# Patient Record
Sex: FEMALE | Race: WHITE | Employment: OTHER | URBAN - METROPOLITAN AREA
[De-identification: names, ages, dates, MRNs, and addresses within clinical notes are randomized per-mention and may not be internally consistent; named-entity substitution may affect disease eponyms.]

---

## 2017-06-14 ENCOUNTER — OFFICE VISIT (OUTPATIENT)
Dept: INTERNAL MEDICINE CLINIC | Facility: CLINIC | Age: 57
End: 2017-06-14

## 2017-06-14 VITALS
HEART RATE: 68 BPM | HEIGHT: 62.25 IN | OXYGEN SATURATION: 99 % | TEMPERATURE: 98 F | SYSTOLIC BLOOD PRESSURE: 112 MMHG | DIASTOLIC BLOOD PRESSURE: 82 MMHG | BODY MASS INDEX: 20.53 KG/M2 | RESPIRATION RATE: 16 BRPM | WEIGHT: 113 LBS

## 2017-06-14 DIAGNOSIS — J01.00 ACUTE MAXILLARY SINUSITIS, RECURRENCE NOT SPECIFIED: Primary | ICD-10-CM

## 2017-06-14 PROCEDURE — 99213 OFFICE O/P EST LOW 20 MIN: CPT | Performed by: PHYSICIAN ASSISTANT

## 2017-06-14 RX ORDER — AMOXICILLIN AND CLAVULANATE POTASSIUM 875; 125 MG/1; MG/1
1 TABLET, FILM COATED ORAL 2 TIMES DAILY
Qty: 20 TABLET | Refills: 0 | Status: SHIPPED | OUTPATIENT
Start: 2017-06-14 | End: 2017-06-24

## 2017-06-14 RX ORDER — ACETAMINOPHEN, ASPIRIN AND CAFFEINE 250; 250; 65 MG/1; MG/1; MG/1
1 TABLET, FILM COATED ORAL EVERY 6 HOURS PRN
COMMUNITY

## 2017-06-14 NOTE — PROGRESS NOTES
Chris Beltrán is a 61year old female  Patient presents with:  Sinus Problem  Migraine      HPI:   Pt states she has not felt well since early May  - has had sinus congestion, headache/migraine on/off  - pt has had ear pressure, PND, feels ache in yolie above   CARDIOVASCULAR: denies chest pain on exertion  GI: denies abdominal pain and denies heartburn, change in appetite or bm's    EXAM:   /82 mmHg  Pulse 68  Temp(Src) 98.3 °F (36.8 °C) (Oral)  Resp 16  Ht 62.25\"  Wt 113 lb  BMI 20.51 kg/m2  SpO2

## 2017-11-17 ENCOUNTER — RESEARCH ENCOUNTER (OUTPATIENT)
Dept: HEMATOLOGY/ONCOLOGY | Facility: HOSPITAL | Age: 57
End: 2017-11-17

## 2017-11-17 NOTE — PROGRESS NOTES
Sent patient certified letter for continued participation on clinical trial [id number] follow up.

## 2017-12-12 ENCOUNTER — OFFICE VISIT (OUTPATIENT)
Dept: INTERNAL MEDICINE CLINIC | Facility: CLINIC | Age: 57
End: 2017-12-12

## 2017-12-12 VITALS
WEIGHT: 118 LBS | RESPIRATION RATE: 12 BRPM | DIASTOLIC BLOOD PRESSURE: 78 MMHG | TEMPERATURE: 98 F | HEIGHT: 62 IN | BODY MASS INDEX: 21.71 KG/M2 | SYSTOLIC BLOOD PRESSURE: 128 MMHG | HEART RATE: 64 BPM

## 2017-12-12 DIAGNOSIS — J32.9 RECURRENT SINUSITIS: Primary | ICD-10-CM

## 2017-12-12 PROCEDURE — 99213 OFFICE O/P EST LOW 20 MIN: CPT | Performed by: INTERNAL MEDICINE

## 2017-12-12 RX ORDER — MOXIFLOXACIN HYDROCHLORIDE 400 MG/1
400 TABLET ORAL DAILY
Qty: 14 TABLET | Refills: 0 | Status: SHIPPED | OUTPATIENT
Start: 2017-12-12 | End: 2017-12-13

## 2017-12-12 NOTE — PROGRESS NOTES
Carson Gilbert is a 61year old female.   HPI:   CC: 1week of vertigo and headache  Purulent mucus now  Pt states dairy triggered it  She did have an ENT lady doctor in the past for chronic sinus infections  Pt states never allergy tested for dairy Comment: 1 drink per day     Family History   Problem Relation Age of Onset   • Heart Disease Father    • Diabetes Father    • Hypertension Father    • Heart Surgery Father      66's   • Heart Disease Mother    • Diabetes Mother    • Hypertension Mother

## 2017-12-13 ENCOUNTER — TELEPHONE (OUTPATIENT)
Dept: INTERNAL MEDICINE CLINIC | Facility: CLINIC | Age: 57
End: 2017-12-13

## 2017-12-13 RX ORDER — LEVOFLOXACIN 500 MG/1
500 TABLET, FILM COATED ORAL DAILY
Qty: 14 TABLET | Refills: 0 | OUTPATIENT
Start: 2017-12-13 | End: 2018-04-04 | Stop reason: ALTCHOICE

## 2017-12-13 NOTE — TELEPHONE ENCOUNTER
Called pharmacy due to page to on call doctor. Pt has red dye allergy and needs new ABT.   Called in Levaquin 500mg DQ x 14 days per Dr Steffi Hernandez

## 2018-04-04 ENCOUNTER — OFFICE VISIT (OUTPATIENT)
Dept: FAMILY MEDICINE CLINIC | Facility: CLINIC | Age: 58
End: 2018-04-04

## 2018-04-04 VITALS
BODY MASS INDEX: 22 KG/M2 | DIASTOLIC BLOOD PRESSURE: 80 MMHG | HEART RATE: 76 BPM | TEMPERATURE: 98 F | OXYGEN SATURATION: 99 % | WEIGHT: 118 LBS | RESPIRATION RATE: 18 BRPM | SYSTOLIC BLOOD PRESSURE: 128 MMHG

## 2018-04-04 DIAGNOSIS — J01.00 ACUTE MAXILLARY SINUSITIS, RECURRENCE NOT SPECIFIED: Primary | ICD-10-CM

## 2018-04-04 PROCEDURE — 99213 OFFICE O/P EST LOW 20 MIN: CPT | Performed by: NURSE PRACTITIONER

## 2018-04-04 RX ORDER — AMOXICILLIN AND CLAVULANATE POTASSIUM 875; 125 MG/1; MG/1
1 TABLET, FILM COATED ORAL 2 TIMES DAILY
Qty: 20 TABLET | Refills: 0 | Status: SHIPPED | OUTPATIENT
Start: 2018-04-04 | End: 2018-04-14

## 2018-04-04 NOTE — PROGRESS NOTES
CHIEF COMPLAINT:   Patient presents with:  Cough: for 1 week  URI: for 1 week      HPI:   Amada Guy is a 60year old female who presents for sinus congestion for over a week. Symptoms have been worsening since onset.  Sinus congestion/pain is descr • Breast Cancer Daughter 16     removal cancerous tumor of breast   • Cancer Maternal Aunt      Thyroid & removal of tumor in kidney   • Cancer Paternal Aunt      colon & bladder      Smoking status: Former Smoker ASSESSMENT:  Daly Stewart is a 60year old female who presents with sinus infection following  A URI for over a week. Reports chills, purulent nasal drainage, facial pressure and tooth pain when chewing. Will treat with antibiotic.   Educated on [employer] ABRS most often follows an upper respiratory infection caused by a virus. Bacteria then infect the lining of your nasal cavity and sinuses.  But you can also get ABRS if you have:  · Nasal allergies  · Long-term nasal swelling and congestion not caused by a These problems may need to be treated in a hospital with intravenous (IV) antibiotic medicine or surgery.   When to call the healthcare provider  Call your healthcare provider if you have any of the following:  · Symptoms that don’t get better, or get worse

## 2018-04-18 ENCOUNTER — OFFICE VISIT (OUTPATIENT)
Dept: FAMILY MEDICINE CLINIC | Facility: CLINIC | Age: 58
End: 2018-04-18

## 2018-04-18 VITALS
WEIGHT: 122 LBS | OXYGEN SATURATION: 98 % | BODY MASS INDEX: 22 KG/M2 | SYSTOLIC BLOOD PRESSURE: 120 MMHG | TEMPERATURE: 98 F | RESPIRATION RATE: 18 BRPM | HEART RATE: 70 BPM | DIASTOLIC BLOOD PRESSURE: 60 MMHG

## 2018-04-18 DIAGNOSIS — J01.01 ACUTE RECURRENT MAXILLARY SINUSITIS: Primary | ICD-10-CM

## 2018-04-18 PROCEDURE — 99213 OFFICE O/P EST LOW 20 MIN: CPT | Performed by: NURSE PRACTITIONER

## 2018-04-18 RX ORDER — DOXYCYCLINE HYCLATE 100 MG/1
100 CAPSULE ORAL 2 TIMES DAILY
Qty: 20 CAPSULE | Refills: 0 | Status: SHIPPED | OUTPATIENT
Start: 2018-04-18 | End: 2018-04-28

## 2018-04-19 NOTE — PROGRESS NOTES
CHIEF COMPLAINT:   Patient presents with:  Sinus Problem: pt recently completed 10 days of abx, continues to have symptoms with fever, drainage, facial and  tooth pain.        HPI:   Gisele Gillis is a 60year old female who presents for sinus congesti • Heart Disease Mother    • Diabetes Mother    • Hypertension Mother    • Stroke Mother      52's   • Cancer Mother      thyroid, skin   • Breast Cancer Daughter 16     removal cancerous tumor of breast   • Cancer Maternal Aunt      Thyroid & removal of tu ASSESSMENT:  Alejandra Guzmán is a 60year old female who presents with reoccurrence of maxillary sinus infection. Pt continues to have no grade temps, facial and molar pain, purulent drainage.  Has long history of migraines, pt unsure if recent migraines The nasal cavity is the large air-filled space behind your nose. The sinuses are a group of spaces formed by the bones of your face. They connect with your nasal cavity. ABRS causes the tissue lining these spaces to become inflamed.  Mucus may not drain nor · Salt wash (saline irrigation). This can help to loosen mucus. Possible complications of ABRS  ABRS may come back or become long-term (chronic).  In rare cases, ABRS may cause complications such as:   · Inflamed tissue around the brain and spinal cord (me

## 2018-09-26 ENCOUNTER — OFFICE VISIT (OUTPATIENT)
Dept: FAMILY MEDICINE CLINIC | Facility: CLINIC | Age: 58
End: 2018-09-26
Payer: COMMERCIAL

## 2018-09-26 VITALS
OXYGEN SATURATION: 98 % | RESPIRATION RATE: 14 BRPM | TEMPERATURE: 98 F | SYSTOLIC BLOOD PRESSURE: 122 MMHG | DIASTOLIC BLOOD PRESSURE: 60 MMHG | HEART RATE: 70 BPM

## 2018-09-26 DIAGNOSIS — J01.90 ACUTE BACTERIAL RHINOSINUSITIS: Primary | ICD-10-CM

## 2018-09-26 DIAGNOSIS — B96.89 ACUTE BACTERIAL RHINOSINUSITIS: Primary | ICD-10-CM

## 2018-09-26 PROCEDURE — 99213 OFFICE O/P EST LOW 20 MIN: CPT | Performed by: NURSE PRACTITIONER

## 2018-09-26 RX ORDER — DOXYCYCLINE HYCLATE 100 MG/1
100 CAPSULE ORAL 2 TIMES DAILY
Qty: 20 CAPSULE | Refills: 0 | Status: SHIPPED | OUTPATIENT
Start: 2018-09-26 | End: 2018-10-06

## 2018-09-26 NOTE — PROGRESS NOTES
CHIEF COMPLAINT:   Patient presents with:  Sinus Problem      HPI:   Rylie Lilly is a 60year old female who presents for sinus infection. Patient admits to a hx of sinus infections r/t dairy products.  In [place] had a table spoon of rice puddi 52's   • Cancer Mother         thyroid, skin   • Breast Cancer Daughter 16        removal cancerous tumor of breast   • Cancer Maternal Aunt         Thyroid & removal of tumor in kidney   • Cancer Paternal Aunt         colon & bladder      Social Hist • Doxycycline Hyclate 100 MG Oral Cap 20 capsule 0     Sig: Take 1 capsule (100 mg total) by mouth 2 (two) times daily for 10 days. Risks, benefits, side effects of medication addressed and explained.     Patient Instructions       Acute Bacterial Rhi ABRS may be diagnosed if you’ve had an upper respiratory infection like a cold and cough for 10 or more days without improvement or with worsening symptoms. Your healthcare provider will ask about your symptoms and your medical history.  The provider will c © 1386-4158 [employer] [address] [facility] [place] [facility] [place]. All rights reserved. This information is not intended as a substitute for professional medical care. Always follow your healthcare professional's instructions.           Brittany

## 2018-10-16 ENCOUNTER — OFFICE VISIT (OUTPATIENT)
Dept: FAMILY MEDICINE CLINIC | Facility: CLINIC | Age: 58
End: 2018-10-16
Payer: COMMERCIAL

## 2018-10-16 VITALS
WEIGHT: 117 LBS | HEART RATE: 67 BPM | TEMPERATURE: 98 F | BODY MASS INDEX: 21.53 KG/M2 | DIASTOLIC BLOOD PRESSURE: 82 MMHG | RESPIRATION RATE: 16 BRPM | HEIGHT: 62 IN | OXYGEN SATURATION: 97 % | SYSTOLIC BLOOD PRESSURE: 122 MMHG

## 2018-10-16 DIAGNOSIS — J01.01 ACUTE RECURRENT MAXILLARY SINUSITIS: Primary | ICD-10-CM

## 2018-10-16 PROCEDURE — 99213 OFFICE O/P EST LOW 20 MIN: CPT | Performed by: PHYSICIAN ASSISTANT

## 2018-10-16 RX ORDER — AMOXICILLIN AND CLAVULANATE POTASSIUM 875; 125 MG/1; MG/1
1 TABLET, FILM COATED ORAL 2 TIMES DAILY
Qty: 20 TABLET | Refills: 0 | Status: SHIPPED | OUTPATIENT
Start: 2018-10-16 | End: 2018-10-26

## 2018-10-16 NOTE — PROGRESS NOTES
CHIEF COMPLAINT:   Patient presents with:  Sinus Problem: had 3 days of migraines, finished antibiotics 5 days ago, felt great after first dose of doxycycline, feeling achy      HPI:   Ellis Suarez is a 60year old female who presents for ongoing s • Heart Surgery Father         66's   • Heart Disease Mother    • Diabetes Mother    • Hypertension Mother    • Stroke Mother         52's   • Cancer Mother         thyroid, skin   • Breast Cancer Daughter 16        removal cancerous tumor of breast   • Ca No results found for this or any previous visit (from the past 24 hour(s)).     ASSESSMENT AND PLAN:   Cristiano Nunez is a 60year old female who presents with URI symptoms that are consistent with:    ASSESSMENT:  Acute recurrent maxillary sinusitis  ( Applying heat to the area surrounding your sinuses may make you feel more comfortable. Use a hot water bottle or a hand towel dipped in hot water. Some people also find ice packs effective for relieving pain.   Medicines  Your doctor may prescribe medicatio · Heat may help soothe painful areas of your face. Use a towel soaked in hot water. Or,  the shower and direct the warm spray onto your face.  Using a vaporizer along with a menthol rub at night may also help soothe symptoms.   · An expectorant with · Swelling of your forehead or eyelids  · Vision problems, such as blurred or double vision  · Fever of 100.4ºF (38ºC) or higher, or as directed by your healthcare provider  · Seizure  · Breathing problems  · Symptoms don't go away in 10 days  Prevention

## 2018-10-16 NOTE — PATIENT INSTRUCTIONS
Self-Care for Sinusitis     Drinking plenty of water can help sinuses drain. Sinusitis can often be managed with self-care. Self-care can keep sinuses moist and make you feel more comfortable. Remember to follow your doctor's instructions closely.  Yoel Inman Sinusitis (Antibiotic Treatment)    The sinuses are air-filled spaces within the bones of the face. They connect to the inside of the nose. Sinusitis is an inflammation of the tissue that lines the sinuses. Sinusitis can occur during a cold.  It can also ha · Do not use nasal rinses or irrigation during an acute sinus infection, unless your healthcare provider tells you to. Rinsing may spread the infection to other areas in your sinuses.   · Use acetaminophen or ibuprofen to control pain, unless another pain m

## 2019-01-14 ENCOUNTER — TELEPHONE (OUTPATIENT)
Dept: HEMATOLOGY/ONCOLOGY | Facility: HOSPITAL | Age: 59
End: 2019-01-14

## 2019-01-14 NOTE — TELEPHONE ENCOUNTER
[facility] [place]   [place] [id number]  9 Year Follow Up    Spoke with patient on the phone today. Patient states she has been doing well. Patient has been [facility] up with [facility] [place].   Patient states last bilateral

## 2019-02-22 ENCOUNTER — NURSE ONLY (OUTPATIENT)
Dept: FAMILY MEDICINE CLINIC | Facility: CLINIC | Age: 59
End: 2019-02-22
Payer: COMMERCIAL

## 2019-02-22 VITALS
TEMPERATURE: 98 F | DIASTOLIC BLOOD PRESSURE: 64 MMHG | SYSTOLIC BLOOD PRESSURE: 134 MMHG | HEART RATE: 77 BPM | OXYGEN SATURATION: 99 %

## 2019-02-22 DIAGNOSIS — J32.9 CHRONIC SINUSITIS, UNSPECIFIED LOCATION: Primary | ICD-10-CM

## 2019-02-22 PROCEDURE — 99213 OFFICE O/P EST LOW 20 MIN: CPT | Performed by: NURSE PRACTITIONER

## 2019-02-22 RX ORDER — MONTELUKAST SODIUM 10 MG/1
10 TABLET ORAL NIGHTLY
Qty: 30 TABLET | Refills: 0 | Status: SHIPPED | OUTPATIENT
Start: 2019-02-22 | End: 2019-03-24

## 2019-02-22 NOTE — PROGRESS NOTES
CHIEF COMPLAINT:   Patient presents with:  URI      HPI:   Juan Carlos Villalobos is a 60year old female who presents for sinus symptoms.  Patient reports post nasal drainage, body aches, chills, feeling febrile, migraine headache, stomach upset due to drainag thyroid, skin   • Breast Cancer Daughter 16        removal cancerous tumor of breast   • Cancer Maternal Aunt         Thyroid & removal of tumor in kidney   • Cancer Paternal Aunt         colon & bladder      Social History    Tobacco Use      Smoking No signs of active bacterial infection. Patient likely has chronic sinus inflammation. She is insisting she has a oncoming infection and would like antibiotics. At this time will start singular, stop the allegra/zyrtec as its not working for you.  Appointme Your doctor will ask about your symptoms and health history. The doctor will look at your nose and face. You may have imaging studies such as an X-ray or CT scan of the sinuses. Your doctor may also take a sample of the drainage to check for bacteria.  You

## 2019-02-25 ENCOUNTER — OFFICE VISIT (OUTPATIENT)
Dept: OTOLARYNGOLOGY | Facility: CLINIC | Age: 59
End: 2019-02-25
Payer: COMMERCIAL

## 2019-02-25 VITALS
BODY MASS INDEX: 22.45 KG/M2 | WEIGHT: 122 LBS | TEMPERATURE: 98 F | DIASTOLIC BLOOD PRESSURE: 82 MMHG | HEIGHT: 62 IN | SYSTOLIC BLOOD PRESSURE: 138 MMHG

## 2019-02-25 DIAGNOSIS — J01.41 ACUTE RECURRENT PANSINUSITIS: Primary | ICD-10-CM

## 2019-02-25 PROCEDURE — 99212 OFFICE O/P EST SF 10 MIN: CPT | Performed by: OTOLARYNGOLOGY

## 2019-02-25 PROCEDURE — 99244 OFF/OP CNSLTJ NEW/EST MOD 40: CPT | Performed by: OTOLARYNGOLOGY

## 2019-02-25 RX ORDER — AMOXICILLIN AND CLAVULANATE POTASSIUM 875; 125 MG/1; MG/1
1 TABLET, FILM COATED ORAL 2 TIMES DAILY
Qty: 28 TABLET | Refills: 0 | Status: SHIPPED | OUTPATIENT
Start: 2019-02-25 | End: 2019-03-11

## 2019-02-26 NOTE — PROGRESS NOTES
Karoline Morelos is a 60year old female.   Patient presents with:  Nose Problem: pt reports constant nasal drainage, post nasal drainage, headache, sinus pressure for 2 wks      HISTORY OF PRESENT ILLNESS  2/25/2019  Patient prevents for evaluation of si Hypertension Mother    • Stroke Mother         52's   • Cancer Mother         thyroid, skin   • Breast Cancer Daughter 16        removal cancerous tumor of breast   • Cancer Maternal Aunt         Thyroid & removal of tumor in kidney   • Cancer Paternal Aun through XII grossly intact.  Gait is normal   Head/Face Normal Facial features - Normal. Eyebrows - Normal. Skull - Normal.             Ears Normal Inspection - Right: Normal, Left: Normal. Canal - Right: Normal, Left: Normal. TM - Right: Normal, Left: Norm

## 2019-03-23 ENCOUNTER — TELEPHONE (OUTPATIENT)
Dept: OTOLARYNGOLOGY | Facility: CLINIC | Age: 59
End: 2019-03-23

## 2019-03-23 NOTE — TELEPHONE ENCOUNTER
Pt saw [facility] 2/25 and forgot to ask if she would be willing to refill medication Montelukast Sodium 10 MG Oral Tablet prescribed by the walk in clinic on 2/22.  Pt states pharmacy would not send refill request due to it originally be prescribed by a different

## 2019-03-25 RX ORDER — MONTELUKAST SODIUM 10 MG/1
10 TABLET ORAL NIGHTLY
Qty: 30 TABLET | Refills: 11 | Status: SHIPPED | OUTPATIENT
Start: 2019-03-25 | End: 2019-04-24

## 2019-03-25 RX ORDER — MONTELUKAST SODIUM 10 MG/1
10 TABLET ORAL NIGHTLY
Qty: 30 TABLET | Refills: 3 | Status: SHIPPED | OUTPATIENT
Start: 2019-03-25 | End: 2019-03-25

## 2019-12-16 ENCOUNTER — OFFICE VISIT (OUTPATIENT)
Dept: OTOLARYNGOLOGY | Facility: CLINIC | Age: 59
End: 2019-12-16
Payer: COMMERCIAL

## 2019-12-16 VITALS
HEART RATE: 65 BPM | DIASTOLIC BLOOD PRESSURE: 83 MMHG | SYSTOLIC BLOOD PRESSURE: 132 MMHG | BODY MASS INDEX: 22.08 KG/M2 | HEIGHT: 62 IN | WEIGHT: 120 LBS

## 2019-12-16 DIAGNOSIS — J32.4 CHRONIC PANSINUSITIS: Primary | ICD-10-CM

## 2019-12-16 PROCEDURE — 99213 OFFICE O/P EST LOW 20 MIN: CPT | Performed by: OTOLARYNGOLOGY

## 2019-12-16 RX ORDER — FLUTICASONE PROPIONATE 50 MCG
SPRAY, SUSPENSION (ML) NASAL DAILY
COMMUNITY
End: 2021-07-07

## 2019-12-16 RX ORDER — AMOXICILLIN AND CLAVULANATE POTASSIUM 875; 125 MG/1; MG/1
1 TABLET, FILM COATED ORAL 2 TIMES DAILY
Qty: 28 TABLET | Refills: 0 | Status: SHIPPED | OUTPATIENT
Start: 2019-12-16 | End: 2019-12-30

## 2019-12-16 RX ORDER — MONTELUKAST SODIUM 10 MG/1
10 TABLET ORAL NIGHTLY
COMMUNITY
End: 2021-07-07

## 2019-12-16 NOTE — PROGRESS NOTES
Lory Roberts is a 60year old female. Patient presents with:  Sinus Problem: pt c/o chills, facial pressure, headaches,   Sore Throat      HISTORY OF PRESENT ILLNESS  2/25/2019  Patient prevents for evaluation of sinusitis. This is recurrent.  Recent Exercise: No      Family History   Problem Relation Age of Onset   • Heart Disease Father    • Diabetes Father    • Hypertension Father    • Heart Surgery Father         66's   • Heart Disease Mother    • Diabetes Mother    • Hypertension Mother    • Sterling Adler Normal. Fundus - Right: Normal, Left: Normal. EOMI   Neurological Normal Memory - Normal. Cranial nerves - Cranial nerves II through XII grossly intact.  Gait is normal   Head/Face Normal Facial features - Normal. Eyebrows - Normal. Skull - Normal. recurrent pansinusitis         Discussed the pathophysiology of and management of sinusitis . I reccomend adequate hydration, inhaled steam or saline, and a course of antibiotics.  If underlying allergies contribute to these episodes I reccomend starting t

## 2020-03-30 ENCOUNTER — TELEPHONE (OUTPATIENT)
Dept: OTOLARYNGOLOGY | Facility: CLINIC | Age: 60
End: 2020-03-30

## 2020-03-30 NOTE — TELEPHONE ENCOUNTER
Pt called stating pt has a sinus infection for one week. Pt does not want to come to the office due to the coronavirus. Will you rx.   Call pt

## 2020-03-30 NOTE — TELEPHONE ENCOUNTER
Per pt symptoms started over 1 week ago. Pt states has been using allegra, Flonase, Singulair, and doing sinus rinse. Pt c/o  pain her teeth, PND, pt feels warm,  throat feel raw, sinus pressure in forehead and behind eyes. Pt did consent to Evisit.

## 2020-03-31 RX ORDER — AMOXICILLIN AND CLAVULANATE POTASSIUM 875; 125 MG/1; MG/1
1 TABLET, FILM COATED ORAL 2 TIMES DAILY
Qty: 28 TABLET | Refills: 0 | Status: SHIPPED | OUTPATIENT
Start: 2020-03-31 | End: 2020-04-14

## 2020-03-31 RX ORDER — DEXAMETHASONE 6 MG/1
TABLET ORAL
Qty: 4 TABLET | Refills: 0 | Status: SHIPPED | OUTPATIENT
Start: 2020-03-31 | End: 2021-07-07 | Stop reason: ALTCHOICE

## 2020-07-20 ENCOUNTER — TELEPHONE (OUTPATIENT)
Dept: OTOLARYNGOLOGY | Facility: CLINIC | Age: 60
End: 2020-07-20

## 2020-07-20 RX ORDER — MONTELUKAST SODIUM 10 MG/1
10 TABLET ORAL NIGHTLY
Qty: 30 TABLET | Refills: 3 | Status: SHIPPED | OUTPATIENT
Start: 2020-07-20 | End: 2020-11-25

## 2020-07-20 NOTE — TELEPHONE ENCOUNTER
Current Outpatient Medications:   •  •  Montelukast Sodium 10 MG Oral Tab, Take 10 mg by mouth nightly., Disp: , Rfl:   •

## 2020-11-25 RX ORDER — MONTELUKAST SODIUM 10 MG/1
TABLET ORAL
Qty: 30 TABLET | Refills: 3 | Status: SHIPPED | OUTPATIENT
Start: 2020-11-25 | End: 2021-07-07

## 2021-06-11 ENCOUNTER — APPOINTMENT (OUTPATIENT)
Dept: CT IMAGING | Facility: HOSPITAL | Age: 61
End: 2021-06-11
Attending: EMERGENCY MEDICINE
Payer: COMMERCIAL

## 2021-06-11 ENCOUNTER — HOSPITAL ENCOUNTER (EMERGENCY)
Facility: HOSPITAL | Age: 61
Discharge: HOME | End: 2021-06-11
Attending: EMERGENCY MEDICINE
Payer: COMMERCIAL

## 2021-06-11 VITALS
SYSTOLIC BLOOD PRESSURE: 113 MMHG | DIASTOLIC BLOOD PRESSURE: 63 MMHG | WEIGHT: 120 LBS | BODY MASS INDEX: 20.49 KG/M2 | HEART RATE: 68 BPM | RESPIRATION RATE: 16 BRPM | HEIGHT: 64 IN | TEMPERATURE: 97 F | OXYGEN SATURATION: 98 %

## 2021-06-11 DIAGNOSIS — S01.511A LIP LACERATION, INITIAL ENCOUNTER: Primary | ICD-10-CM

## 2021-06-11 PROCEDURE — 70450 CT HEAD/BRAIN W/O DYE: CPT | Performed by: EMERGENCY MEDICINE

## 2021-06-11 PROCEDURE — 99284 EMERGENCY DEPT VISIT MOD MDM: CPT

## 2021-06-12 NOTE — ED INITIAL ASSESSMENT (HPI)
Pt arrived to ED triage, pt reports having fall at home, reports tripped over lounge chair in back yard when pt fell face first. + head injury, denies loc. Pt denies head neck or back pain. Pt noted to have lac to bottom lip.

## 2021-06-12 NOTE — ED QUICK NOTES
Patient safe to DC home per MD. Princess Kwong to dress self. DC teaching done, instructions reviewed with patient including when and how to follow up with healthcare providers and when to seek emergency care. The patient verbalizes understanding.  Patient ambulatory

## 2021-06-12 NOTE — ED PROVIDER NOTES
Patient Seen in: [facility] AND [facility] Emergency Department      History   Patient presents with:  Fall  Laceration/Abrasion    Stated Complaint: lac in lip. mechanical fall    HPI/Subjective:   HPI    57year old female who sustained lower inner lip lacerat atraumatic. Mouth/Throat:      Mouth: Lacerations present. Dentition: Normal dentition. Comments: 2cm mildly gaping laceration to inner lower lip. Does not puncture through exterior skin. No dental abnormality. Bleeding controlled.    Eyes: [id number]  [phone]    Call  call with update on your visit and symptoms          Medications Prescribed:  Discharge Medication List as of 6/11/2021  9:26 PM

## 2021-07-06 RX ORDER — MONTELUKAST SODIUM 10 MG/1
TABLET ORAL
Qty: 30 TABLET | Refills: 3 | OUTPATIENT
Start: 2021-07-06

## 2021-07-07 ENCOUNTER — OFFICE VISIT (OUTPATIENT)
Dept: OTOLARYNGOLOGY | Facility: CLINIC | Age: 61
End: 2021-07-07
Payer: COMMERCIAL

## 2021-07-07 VITALS
DIASTOLIC BLOOD PRESSURE: 70 MMHG | WEIGHT: 120 LBS | HEIGHT: 64 IN | SYSTOLIC BLOOD PRESSURE: 110 MMHG | BODY MASS INDEX: 20.49 KG/M2

## 2021-07-07 DIAGNOSIS — J32.4 CHRONIC PANSINUSITIS: Primary | ICD-10-CM

## 2021-07-07 PROCEDURE — 99213 OFFICE O/P EST LOW 20 MIN: CPT | Performed by: OTOLARYNGOLOGY

## 2021-07-07 PROCEDURE — 3008F BODY MASS INDEX DOCD: CPT | Performed by: OTOLARYNGOLOGY

## 2021-07-07 PROCEDURE — 3078F DIAST BP <80 MM HG: CPT | Performed by: OTOLARYNGOLOGY

## 2021-07-07 PROCEDURE — 3074F SYST BP LT 130 MM HG: CPT | Performed by: OTOLARYNGOLOGY

## 2021-07-07 RX ORDER — MONTELUKAST SODIUM 10 MG/1
10 TABLET ORAL NIGHTLY
Qty: 90 TABLET | Refills: 3 | Status: SHIPPED | OUTPATIENT
Start: 2021-07-07 | End: 2021-07-07

## 2021-07-07 RX ORDER — FEXOFENADINE HCL 180 MG/1
180 TABLET ORAL DAILY
Qty: 90 TABLET | Refills: 3 | Status: SHIPPED | OUTPATIENT
Start: 2021-07-07

## 2021-07-07 RX ORDER — FLUTICASONE PROPIONATE 50 MCG
2 SPRAY, SUSPENSION (ML) NASAL DAILY
Qty: 3 EACH | Refills: 3 | Status: SHIPPED | OUTPATIENT
Start: 2021-07-07

## 2021-07-07 RX ORDER — MONTELUKAST SODIUM 10 MG/1
10 TABLET ORAL NIGHTLY
Qty: 90 TABLET | Refills: 3 | Status: SHIPPED | OUTPATIENT
Start: 2021-07-07

## 2021-07-07 NOTE — PROGRESS NOTES
Mary Perales is a 57year old female. Patient presents with: Follow - Up: chronic pansinusitis ,refill medication ,no complaints today       HISTORY OF PRESENT ILLNESS  2/25/2019  Patient prevents for evaluation of sinusitis. This is recurrent.  Rec GRECIA MAEDRA    Tobacco Use      Smoking status: Former Smoker        Packs/day: 1.00        Years: 22.00        Pack years: 22        Types: Cigarettes      Smokeless tobacco: Never Used      Tobacco comment: quit 1990    Substance and Sexual Activity bruising. PHYSICAL EXAM    /70   Ht 5' 4\" (1.626 m)   Wt 120 lb (54.4 kg)   BMI 20.60 kg/m²        Constitutional Normal Overall appearance - Normal.   Psychiatric Normal Orientation - Oriented to time, place, person & situation.  Appropria (six) hours as needed for Pain., Disp: , Rfl:   •  Cholecalciferol (VITAMIN D3) 84698 UNITS Oral Cap, Take 1 tablet by mouth once a week., Disp: , Rfl:   •  ASTRAGALUS OR, 1 tab by mouth daily, Disp: , Rfl:   ASSESSMENT AND PLAN    1.  Acute recurrent pansi

## 2022-03-11 ENCOUNTER — APPOINTMENT (OUTPATIENT)
Dept: GENERAL RADIOLOGY | Age: 62
End: 2022-03-11
Attending: NURSE PRACTITIONER
Payer: COMMERCIAL

## 2022-03-11 ENCOUNTER — HOSPITAL ENCOUNTER (OUTPATIENT)
Age: 62
Discharge: HOME | End: 2022-03-11
Payer: COMMERCIAL

## 2022-03-11 VITALS
OXYGEN SATURATION: 98 % | HEART RATE: 79 BPM | DIASTOLIC BLOOD PRESSURE: 95 MMHG | WEIGHT: 120 LBS | BODY MASS INDEX: 21.26 KG/M2 | RESPIRATION RATE: 18 BRPM | TEMPERATURE: 99 F | HEIGHT: 63 IN | SYSTOLIC BLOOD PRESSURE: 150 MMHG
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
## 2022-03-11 DIAGNOSIS — S16.1XXA STRAIN OF NECK MUSCLE, INITIAL ENCOUNTER: ICD-10-CM

## 2022-03-11 PROCEDURE — 72040 X-RAY EXAM NECK SPINE 2-3 VW: CPT | Performed by: NURSE PRACTITIONER

## 2022-03-11 PROCEDURE — 99203 OFFICE O/P NEW LOW 30 MIN: CPT | Performed by: NURSE PRACTITIONER

## 2022-03-11 RX ORDER — NAPROXEN 500 MG/1
500 TABLET ORAL 2 TIMES DAILY PRN
Qty: 20 TABLET | Refills: 0 | Status: SHIPPED | OUTPATIENT
Start: 2022-03-11 | End: 2022-03-18

## 2022-03-11 RX ORDER — CYCLOBENZAPRINE HCL 10 MG
10 TABLET ORAL 3 TIMES DAILY PRN
Qty: 20 TABLET | Refills: 0 | Status: SHIPPED | OUTPATIENT
Start: 2022-03-11 | End: 2022-03-18
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
## 2023-02-08 ENCOUNTER — TELEPHONE (OUTPATIENT)
Dept: INTERNAL MEDICINE CLINIC | Facility: CLINIC | Age: 63
End: 2023-02-08

## 2023-02-08 NOTE — TELEPHONE ENCOUNTER
LM for pt to please call us with further information on setting up appointment as a NP with Dr. Genoveva Zuleta or NP's.     Pt sent message in Pt appt request

## 2023-02-23 ENCOUNTER — OFFICE VISIT (OUTPATIENT)
Dept: FAMILY MEDICINE CLINIC | Facility: CLINIC | Age: 63
End: 2023-02-23
Payer: COMMERCIAL

## 2023-02-23 VITALS
BODY MASS INDEX: 21.44 KG/M2 | DIASTOLIC BLOOD PRESSURE: 85 MMHG | OXYGEN SATURATION: 100 % | RESPIRATION RATE: 18 BRPM | HEIGHT: 63 IN | TEMPERATURE: 98 F | SYSTOLIC BLOOD PRESSURE: 140 MMHG | WEIGHT: 121 LBS | HEART RATE: 77 BPM

## 2023-02-23 DIAGNOSIS — R03.0 ELEVATED BLOOD PRESSURE READING: ICD-10-CM

## 2023-02-23 DIAGNOSIS — J01.90 ACUTE RHINOSINUSITIS: Primary | ICD-10-CM

## 2023-02-23 PROCEDURE — 3008F BODY MASS INDEX DOCD: CPT | Performed by: PHYSICIAN ASSISTANT

## 2023-02-23 PROCEDURE — 3077F SYST BP >= 140 MM HG: CPT | Performed by: PHYSICIAN ASSISTANT

## 2023-02-23 PROCEDURE — 99213 OFFICE O/P EST LOW 20 MIN: CPT | Performed by: PHYSICIAN ASSISTANT

## 2023-02-23 PROCEDURE — 3079F DIAST BP 80-89 MM HG: CPT | Performed by: PHYSICIAN ASSISTANT

## 2023-02-23 RX ORDER — AMOXICILLIN AND CLAVULANATE POTASSIUM 875; 125 MG/1; MG/1
1 TABLET, FILM COATED ORAL 2 TIMES DAILY
Qty: 20 TABLET | Refills: 0 | Status: SHIPPED | OUTPATIENT
Start: 2023-02-23 | End: 2023-03-05

## 2023-03-19 ENCOUNTER — OFFICE VISIT (OUTPATIENT)
Dept: FAMILY MEDICINE CLINIC | Facility: CLINIC | Age: 63
End: 2023-03-19
Payer: COMMERCIAL

## 2023-03-19 VITALS
TEMPERATURE: 98 F | WEIGHT: 123.81 LBS | BODY MASS INDEX: 21.94 KG/M2 | OXYGEN SATURATION: 99 % | DIASTOLIC BLOOD PRESSURE: 76 MMHG | HEART RATE: 76 BPM | HEIGHT: 63 IN | SYSTOLIC BLOOD PRESSURE: 150 MMHG | RESPIRATION RATE: 16 BRPM

## 2023-03-19 DIAGNOSIS — J01.91 ACUTE RECURRENT SINUSITIS, UNSPECIFIED LOCATION: Primary | ICD-10-CM

## 2023-03-19 PROCEDURE — 99213 OFFICE O/P EST LOW 20 MIN: CPT | Performed by: NURSE PRACTITIONER

## 2023-03-19 PROCEDURE — 3008F BODY MASS INDEX DOCD: CPT | Performed by: NURSE PRACTITIONER

## 2023-03-19 PROCEDURE — 3077F SYST BP >= 140 MM HG: CPT | Performed by: NURSE PRACTITIONER

## 2023-03-19 PROCEDURE — 3078F DIAST BP <80 MM HG: CPT | Performed by: NURSE PRACTITIONER

## 2023-03-19 RX ORDER — DOXYCYCLINE HYCLATE 100 MG/1
100 CAPSULE ORAL 2 TIMES DAILY
Qty: 14 CAPSULE | Refills: 0 | Status: SHIPPED | OUTPATIENT
Start: 2023-03-19 | End: 2023-03-26

## 2023-05-10 ENCOUNTER — APPOINTMENT (OUTPATIENT)
Dept: GENERAL RADIOLOGY | Age: 63
End: 2023-05-10
Attending: PHYSICIAN ASSISTANT
Payer: COMMERCIAL

## 2023-05-10 ENCOUNTER — HOSPITAL ENCOUNTER (OUTPATIENT)
Age: 63
Discharge: HOME | End: 2023-05-10
Payer: COMMERCIAL

## 2023-05-10 ENCOUNTER — OFFICE VISIT (OUTPATIENT)
Dept: FAMILY MEDICINE CLINIC | Facility: CLINIC | Age: 63
End: 2023-05-10
Payer: COMMERCIAL

## 2023-05-10 VITALS
BODY MASS INDEX: 20.91 KG/M2 | SYSTOLIC BLOOD PRESSURE: 129 MMHG | TEMPERATURE: 99 F | OXYGEN SATURATION: 99 % | HEIGHT: 63 IN | WEIGHT: 118 LBS | HEART RATE: 95 BPM | RESPIRATION RATE: 18 BRPM | DIASTOLIC BLOOD PRESSURE: 76 MMHG

## 2023-05-10 VITALS
RESPIRATION RATE: 18 BRPM | HEART RATE: 98 BPM | DIASTOLIC BLOOD PRESSURE: 76 MMHG | SYSTOLIC BLOOD PRESSURE: 126 MMHG | OXYGEN SATURATION: 98 % | TEMPERATURE: 98 F

## 2023-05-10 DIAGNOSIS — R05.1 ACUTE COUGH: ICD-10-CM

## 2023-05-10 DIAGNOSIS — U07.1 COVID-19 VIRUS INFECTION: Primary | ICD-10-CM

## 2023-05-10 DIAGNOSIS — R06.89 ABNORMAL BREATH SOUNDS: ICD-10-CM

## 2023-05-10 DIAGNOSIS — U07.1 COVID-19: Primary | ICD-10-CM

## 2023-05-10 DIAGNOSIS — R05.9 COUGH IN ADULT: ICD-10-CM

## 2023-05-10 LAB
OPERATOR ID: ABNORMAL
RAPID SARS-COV-2 BY PCR: DETECTED

## 2023-05-10 PROCEDURE — 71046 X-RAY EXAM CHEST 2 VIEWS: CPT | Performed by: PHYSICIAN ASSISTANT

## 2023-05-10 PROCEDURE — 99213 OFFICE O/P EST LOW 20 MIN: CPT

## 2023-05-10 PROCEDURE — 99214 OFFICE O/P EST MOD 30 MIN: CPT

## 2023-05-10 PROCEDURE — 3008F BODY MASS INDEX DOCD: CPT | Performed by: NURSE PRACTITIONER

## 2023-05-10 PROCEDURE — 3074F SYST BP LT 130 MM HG: CPT | Performed by: NURSE PRACTITIONER

## 2023-05-10 PROCEDURE — U0002 COVID-19 LAB TEST NON-CDC: HCPCS | Performed by: NURSE PRACTITIONER

## 2023-05-10 PROCEDURE — 3078F DIAST BP <80 MM HG: CPT | Performed by: NURSE PRACTITIONER

## 2023-05-10 PROCEDURE — 99213 OFFICE O/P EST LOW 20 MIN: CPT | Performed by: NURSE PRACTITIONER

## 2023-05-10 RX ORDER — BENZONATATE 200 MG/1
200 CAPSULE ORAL 3 TIMES DAILY PRN
Qty: 30 CAPSULE | Refills: 0 | Status: SHIPPED | OUTPATIENT
Start: 2023-05-10 | End: 2023-05-20

## 2023-05-10 NOTE — ED INITIAL ASSESSMENT (HPI)
Sent from walk in clinic for CXR to r/o pneumonia. + COVID positive per patient has had a productive cough over one month

## 2023-05-19 ENCOUNTER — TELEPHONE (OUTPATIENT)
Dept: INTERNAL MEDICINE CLINIC | Facility: CLINIC | Age: 63
End: 2023-05-19

## 2023-05-19 NOTE — TELEPHONE ENCOUNTER
Per Marilia's request, attempted to contact patient to confirm PCP. If she wants to follow in our office, she needs an appt in one month.

## 2024-05-03 NOTE — DISCHARGE INSTRUCTIONS
5/3-4.0 abx You should take tylenol and ibuprofen for body aches, fatigue, fever/chills    Paxlovid may be taken as directed.   Start treatment within 5 days of onset of symptoms

## 2025-03-06 ENCOUNTER — TELEPHONE (OUTPATIENT)
Dept: SURGERY | Facility: CLINIC | Age: 65
End: 2025-03-06

## 2025-04-23 ENCOUNTER — TELEPHONE (OUTPATIENT)
Dept: SURGERY | Facility: CLINIC | Age: 65
End: 2025-04-23

## 2025-04-23 NOTE — TELEPHONE ENCOUNTER
Called patient and left voicemail to inquire if she has has a screening mammogram and if she was able to obtain records from her previous surgeries.

## 2025-04-28 ENCOUNTER — TELEPHONE (OUTPATIENT)
Dept: SURGERY | Facility: CLINIC | Age: 65
End: 2025-04-28

## 2025-04-29 ENCOUNTER — TELEPHONE (OUTPATIENT)
Dept: SURGERY | Facility: CLINIC | Age: 65
End: 2025-04-29

## 2025-04-30 ENCOUNTER — TELEPHONE (OUTPATIENT)
Dept: SURGERY | Facility: CLINIC | Age: 65
End: 2025-04-30

## 2025-04-30 NOTE — TELEPHONE ENCOUNTER
Called pt back today, LVM to call our office back regarding if she was able to obtaining medical records, operative reports or even if she still has her implant cards?

## 2025-05-05 ENCOUNTER — TELEPHONE (OUTPATIENT)
Facility: CLINIC | Age: 65
End: 2025-05-05

## 2025-05-05 NOTE — TELEPHONE ENCOUNTER
Called pt LVM to call our office back, we need her mammogram report and any operative reports she can get, or her implant cards, if not we will have to reschedule her appt     Called pt back to give her the office email, and her her mammogram is Wednesday 05.07.25 @ 2:30, pt stated she doesn't have her implant card.but will email what she has.

## 2025-05-13 ENCOUNTER — OFFICE VISIT (OUTPATIENT)
Facility: CLINIC | Age: 65
End: 2025-05-13
Payer: COMMERCIAL

## 2025-05-13 VITALS
HEART RATE: 69 BPM | RESPIRATION RATE: 16 BRPM | BODY MASS INDEX: 23.77 KG/M2 | DIASTOLIC BLOOD PRESSURE: 78 MMHG | TEMPERATURE: 98 F | OXYGEN SATURATION: 100 % | WEIGHT: 130.81 LBS | HEIGHT: 62.4 IN | SYSTOLIC BLOOD PRESSURE: 145 MMHG

## 2025-05-13 DIAGNOSIS — Z98.890 H/O BREAST RECONSTRUCTION: Primary | ICD-10-CM

## 2025-05-13 PROCEDURE — 3008F BODY MASS INDEX DOCD: CPT | Performed by: SURGERY

## 2025-05-13 PROCEDURE — 3077F SYST BP >= 140 MM HG: CPT | Performed by: SURGERY

## 2025-05-13 PROCEDURE — 3078F DIAST BP <80 MM HG: CPT | Performed by: SURGERY

## 2025-05-13 PROCEDURE — 99203 OFFICE O/P NEW LOW 30 MIN: CPT | Performed by: SURGERY

## 2025-05-13 NOTE — CONSULTS
New Patient Consultation    This is the first visit for this 65 year old female interested in revision breast surgery.    History of Present Illness:   The patient is a 65 year old female who presents to discuss revision breast surgery.  The patient has a remote history of bilateral transumbilical saline breast augmentation in 1988.  The patient subsequently was diagnosed with a right breast cancer in 2008 for which she underwent mastectomy and saline implant reconstruction as well as adjuvant chemotherapy and chest wall radiation.  Her last left breast imaging was last week and showed focal asymmetries for which additional imaging was recommended and is scheduled for tomorrow.  The patient complains of right breast tightness and ptosis of her left breast.  She is interested in options for revision surgery.    Past Medical History:      Past Medical History[1]      Past Surgical History:  Past Surgical History[2]      Medications:    Medications Ordered Prior to Encounter[3]      Allergies:    Allergies[4]      Family History:   Family History[5]      Social History:  History   Alcohol Use    0.0 standard drinks of alcohol/week     Comment: 1 drink per day        History   Smoking Status    Former    Packs/day: 1.00    Years: 22.00    Types: Cigarettes   Smokeless Tobacco    Never     Comment: quit 1990       History   Drug Use No           Review of Systems:    General:   The patient denies, fever, chills, night sweats, fatigue, generalized weakness, change in appetite, weight loss, or weight gain.    Endocrine:   There is no history of poor/slow wound healing, weight loss/gain, fertility or hormone problems, cold intolerance, heat intolerance, excessive thirst, or thyroid disease.     Allergic/Immunologic:  There is no history of hives, hay fever, angioedema or anaphylaxis.    HEENT:    The patient denies ear pain, ear drainage, hearing loss, change in vision, double vision, cataracts, glaucoma, nasal congestion,  nosebleed, hoarseness, sore throat, or swollen glands    Respiratory:   The patient denies shortness of breath, cough, bloody cough, phlegm, asthma, or wheezing    Cardiovascular:  The patient denies chest pain/pressure, palpitations, irregular heartbeat, high blood pressure, stroke, or leg swelling    Breasts:  Patient denies breast masses, pain, change in the breast skin, skin dimpling, nipple discharge, or rash    Gastrointestinal:   There is no history of difficulty or pain with swallowing, reflux symptoms, nausea, vomiting, dark/ bloody stools, diarrhea, constipation,  change in bowel habits, or abdominal pain.     Genitourinary:  The patient denies frequent urination, needing to get up at night to urinate, urinary hesitancy or retaining urine, painful urination, urinary incontinence, decreased urine stream, blood in the urine, or vaginal/penile discharge.    Skin:   The patient denies rash, itching, skin lesions, dry skin, change in skin color or change in moles, sunburns, or sunburns with blistering.     Hematologic/Lymphatic:  The patient denies easily bruising or bleeding, persistent swollen glands or lymph nodes, bleeding disorders, blood clots, or pulmonary embolism.     Gynecologic:  The patient denies irregular menses, pelvic pain, pain with intercourse, painful menses, or pregnancy    Musculoskeletal:  The patient denies muscle aches/pain, joint pain, stiff joints, neck pain, back pain or bone pain.    Neurologic:  There is no history of migraines or severe headaches, seizure/epilepsy, speech problems, coordination problems, trembling/tremors, fainting/black outs, dizziness, memory problems, loss of sensation/numbness, problems walking, weakness, tingling or burning in hands/feet.     Psychiatric:  There is no history of abusive relationship, bipolar disorder, sleep disturbance, anxiety, depression or feeling of despair.    Physical Exam:  Vitals:    05/13/25 0850   BP: 145/78   Pulse: 69   Resp: 16    Temp: 98.4 °F (36.9 °C)     Body mass index is 23.62 kg/m².      The patient is awake, alert, and oriented.  She is a well-nourished, well-developed female who appears her stated age. Her speech patterns and movements are normal, and affect is appropriate.    HEENT: The head is normocephalic. The neck is supple. The thyroid is not enlarged and is without palpable masses.  The trachea is in the midline. Conjunctiva are clear, non-icteric.    Breasts: In general inspection of the breast shows significant asymmetry in the volume and footprint of the breast with the right breast being smaller and more superiorly displaced than the left.  The right inframammary fold is approximately 3 cm higher than the left.  The left breast is noted to have well-healed Wise pattern scars.  The right breast skin is noted to have hyperpigmentation and telangiectasias consistent with radiation change.  A paucity of subcutaneous fat is noted throughout.  The right latissimus dorsi muscle is innervated.  There are no dominant masses noted bilaterally.  There is no left nipple inversion or nipple discharge noted.    Abdomen: Well-healed periumbilical scars are noted.  A moderate lower abdominal pannus is noted.  There are no palpable hernias noted.    Lymph Nodes:  There is no cervical, supraclavicular, or axillary lymphadenopathy appreciated.    Back: There is no vertebral column tenderness.    Skin: The skin appears normal. There are no suspicious appearing rashes or lesions.    Extremities: The extremities are without deformity, cyanosis or edema.    Impression:   The patient is a 65 year old female with a history of right breast cancer status post saline implant reconstruction who now presents with grade 3 capsular contracture    Discussion and Plan:  Treatment options were reviewed with the patient.  Specifically, we discussed expectant management, revision of the implant based reconstruction, latissimus dorsi flap, or abdominal free  flap reconstruction.  The advantages and disadvantages of each approach were discussed.  The patient is interested in abdominal free flap reconstruction. The nature and technique of breast reconstruction with abdominal free flap was reviewed with the patient. We reviewed the variations of abdominal free flap reconstruction including TUAN flap, SIEA flap, muscle-sparing free TRAM flap, and free TRAM flap. Representative illustrations and photographs were demonstrated to the patient. The risks of surgery including but not limited to bleeding, infection, scarring, seroma, delayed wound healing, partial/total flap loss, fat necrosis, asymmetry, injury to adjacent structures, abdominal hernia/weakness/bulge, need for further surgery, and venous thromboembolism were reviewed. The expected post-operative course was discussed including the need for activity limitation, drains, and suture removal. Finally, we discussed the possible need for revisions, the process of nipple areolar reconstruction, and a contralateral balancing procedure (implant exchange/revision mastopexy).     Multiple questions were answered to the patient's satisfaction. No guarantees as to outcome were offered. The patient expresses understanding and wishes to proceed.  Will obtain a preoperative CT angiogram for  mapping and to confirm patency of the deep inferior epigastric system.  The patient will then return for preoperative visit approximately 6 weeks prior to her scheduled surgery date.. A total of 40 minutes was spent reviewing the patient's history and diagnostic testing, examining the patient, reviewing reconstructive options, and coordinating the patient's care.          [1]   Past Medical History:   Breast cancer (HCC)    right triple negative, mastectomy, chemo, radiation,    Cancer (HCC)    Breast    Neuropathy    Perforated ear drum    Left   [2]   Past Surgical History:  Procedure Laterality Date    Other ambl surg  2009     [facility] - right mastectomy, left lift   [3]   Current Outpatient Medications on File Prior to Visit   Medication Sig Dispense Refill    topiramate 25 MG Oral Tab Take 1 tablet (25 mg total) by mouth 2 (two) times daily. 180 tablet 3    RELPAX 40 MG Oral Tab Take 1 tablet (40 mg total) by mouth every 2 (two) hours as needed (migraine). 12 tablet 11    Montelukast Sodium 10 MG Oral Tab Take 1 tablet (10 mg total) by mouth nightly. 90 tablet 3    Fluticasone Propionate 50 MCG/ACT Nasal Suspension 2 sprays by Each Nare route daily. (Patient not taking: Reported on 3/19/2023) 3 each 3    Fexofenadine HCl (ALLEGRA ALLERGY) 180 MG Oral Tab Take 1 tablet (180 mg total) by mouth daily. 90 tablet 3    aspirin-acetaminophen-caffeine 250-250-65 MG Oral Tab Take 1 tablet by mouth every 6 (six) hours as needed for Pain.      Cholecalciferol (VITAMIN D3) 07887 UNITS Oral Cap Take 1 tablet by mouth once a week.      ASTRAGALUS OR 1 tab by mouth daily       No current facility-administered medications on file prior to visit.   [4]   Allergies  Allergen Reactions    Latex ANAPHYLAXIS    Neosporin [Neomycin-Bacitracin-Polymyxin] OTHER (SEE COMMENTS)     States battery acid on skin,burns and  blisters    Codeine ARRHYTHMIA    Dairy Products     Erythromycin NAUSEA AND VOMITING    Red Dye #40 [Fd&C Red #40] UNKNOWN    Vicodin [Hydrocodone-Acetaminophen] NAUSEA AND VOMITING   [5]   Family History  Problem Relation Age of Onset    Heart Disease Father     Diabetes Father     Hypertension Father     Heart Surgery Father         70's    Heart Disease Mother     Diabetes Mother     Hypertension Mother     Stroke Mother         50's    Cancer Mother         thyroid, skin    Breast Cancer Daughter 17        removal cancerous tumor of breast    Cancer Maternal Aunt         Thyroid & removal of tumor in kidney    Cancer Paternal Aunt         colon & bladder

## 2025-05-13 NOTE — PATIENT INSTRUCTIONS
This is not a pre-op    Surgeon:         Dr. Isacc Gore                                        Tel:         [phone]                                  Fax:        [phone]    Surgery/Procedure: Delayed right breast reconstruction with abdominal free flaps. 8 hours, general anesthesia, admit to ICU. Request Intra-Operative TAP Block per Anesthesia.     Our office offers a TUAN flap class to all of our patients. Please let a staff member know if you would like to schedule a class.  Classes are offered at [facility] on Mondays, Wednesdays, and Fridays.      Dx Code:     Hospital:  [facility]: [address] [place] [address]           [phone]  [facility]: [address] [place] [address]               [phone]    1. Someone will need to drive you to and from the hospital if your procedure is outpatient.    2.Do not drink alcohol or smoke 24 hours prior to your procedure.    3. Bring a picture ID and your insurance card.    4. You will be contacted by the hospital the day before to confirm the procedure time and location.     5. Do not take any herbal supplements or blood thinners at least one week before your procedure/surgery. This includes NSAID's (aspirin, baby aspirin, Motrin, Ibuprofen, Aleve, Advil, Naproxen, etc), Fish oil, vitamin E, turmeric, CoQ10, or green tea supplements, etc. *TYLENOL or acetaminophen is ok to take*    6. PRE-OPERATIVE TESTING: History and physical with medical clearance is REQUIRED within 30 days of the surgery date and is mandatory per Dr. Gore. *If this is not done, your surgery will be postponed. To avoid delays with your clearance, please ensure your PCP appointment is at least 14 days prior to your surgical date.*  MEDICAL CLEARANCE WITH  ____  CBC  CMP  EKG (within 90 days)    7. If you take Coumadin, Plavix, Xarelto, or Eliquis, please contact your prescribing physician for special instructions on how long to hold. If you  take insulin, contact your primary care physician for special instructions.     8. Please inform us if you start or change any medications at least one week before surgery (ex: blood thinners, weight loss medications, diabetic medications, herbal supplements, etc)    9. Does patient have diagnosis of sleep apnea?    [   ] Yes     [   ]  No    Consent obtained  Photos taken on ______

## 2025-05-14 ENCOUNTER — TELEPHONE (OUTPATIENT)
Dept: INTERNAL MEDICINE CLINIC | Facility: CLINIC | Age: 65
End: 2025-05-14

## 2025-05-16 ENCOUNTER — PATIENT OUTREACH (OUTPATIENT)
Dept: CASE MANAGEMENT | Age: 65
End: 2025-05-16

## 2025-05-16 DIAGNOSIS — Z98.890 H/O BREAST RECONSTRUCTION: Primary | ICD-10-CM

## 2025-05-16 NOTE — PROCEDURES
The office order for PCP removal request is Approved and finalized on May 16, 2025.    Removed Megan Sheehan MD as the patient's Primary Care Physician

## 2025-05-21 ENCOUNTER — HOSPITAL ENCOUNTER (OUTPATIENT)
Dept: CT IMAGING | Age: 65
Discharge: HOME | End: 2025-05-21
Attending: SURGERY
Payer: COMMERCIAL

## 2025-05-21 DIAGNOSIS — Z98.890 H/O BREAST RECONSTRUCTION: ICD-10-CM

## 2025-05-21 LAB
CREAT BLD-MCNC: 1 MG/DL (ref 0.55–1.02)
EGFRCR SERPLBLD CKD-EPI 2021: 62 ML/MIN/1.73M2 (ref 60–?)

## 2025-05-21 PROCEDURE — 82565 ASSAY OF CREATININE: CPT

## 2025-05-21 PROCEDURE — 74174 CTA ABD&PLVS W/CONTRAST: CPT | Performed by: SURGERY

## 2025-05-22 ENCOUNTER — TELEPHONE (OUTPATIENT)
Dept: SURGERY | Facility: CLINIC | Age: 65
End: 2025-05-22

## 2025-05-22 NOTE — TELEPHONE ENCOUNTER
CTA results reviewed by Dr. Gore. Patient may proceed with TUAN flap surgery based on results.  Contacted patient to relay the above. Patient would like to be placed on the wait list for any cancellations for her surgery. Will also be contacting patient for a preoperative appointment with Dr. Gore.   No

## 2025-08-06 DIAGNOSIS — Z98.890 H/O BREAST RECONSTRUCTION: Primary | ICD-10-CM

## 2025-08-19 ENCOUNTER — OFFICE VISIT (OUTPATIENT)
Facility: CLINIC | Age: 65
End: 2025-08-19

## 2025-08-19 DIAGNOSIS — Z90.11 ABSENCE OF BREAST, RIGHT: Primary | ICD-10-CM

## 2025-08-19 PROCEDURE — 99213 OFFICE O/P EST LOW 20 MIN: CPT | Performed by: SURGERY

## 2025-08-19 RX ORDER — CHLORHEXIDINE GLUCONATE 4 %
LIQUID (ML) TOPICAL
COMMUNITY

## 2025-08-20 DIAGNOSIS — Z90.11 ABSENCE OF BREAST, RIGHT: Primary | ICD-10-CM

## (undated) NOTE — MR AVS SNAPSHOT
[address]  [address] [address]  [phone]               Thank you for choosing us for your health care visit with ASAEL Mcdonald.   We are glad to serve you and happy to provide you wi Take 1 tablet by mouth every 6 (six) hours as needed for Pain. topiramate 25 MG Tabs   Take 1 tablet (25 mg total) by mouth 2 (two) times daily. Commonly known as:   Topamax           Vitamin D3 04286 units Caps   Take 1 tablet by mouth once a w

## (undated) NOTE — LETTER
June 2, 2023        [address] [facility] [address]      Dear Regis Ruiz: We have attempted to contact you by phone with no success. In an effort to provide quality patient care we are reaching out to you to contact the office at your earliest convenience to confirm your primary care physician. If you have established care with a different provider, please call our office so we can update your file to list the correct provider name and information. Once we update your file with this information it will eliminate further phone calls and/or correspondence from our office. Thank you for your cooperation. If you have recently contacted us back, please disregard this letter.       You may call the office at [phone] our phones are on service Monday-Thursday 8am-4:45 pm and Friday 8am-3 pm.      Thank you,      The office of Lori Randhawa MD